# Patient Record
Sex: MALE | Race: WHITE | NOT HISPANIC OR LATINO | URBAN - METROPOLITAN AREA
[De-identification: names, ages, dates, MRNs, and addresses within clinical notes are randomized per-mention and may not be internally consistent; named-entity substitution may affect disease eponyms.]

---

## 2017-06-22 ENCOUNTER — EMERGENCY (EMERGENCY)
Facility: HOSPITAL | Age: 37
LOS: 1 days | Discharge: HOME | End: 2017-06-22
Attending: EMERGENCY MEDICINE | Admitting: EMERGENCY MEDICINE
Payer: OTHER MISCELLANEOUS

## 2017-06-22 VITALS
DIASTOLIC BLOOD PRESSURE: 85 MMHG | OXYGEN SATURATION: 98 % | HEART RATE: 77 BPM | TEMPERATURE: 98 F | SYSTOLIC BLOOD PRESSURE: 132 MMHG | RESPIRATION RATE: 15 BRPM

## 2017-06-22 PROCEDURE — 99282 EMERGENCY DEPT VISIT SF MDM: CPT

## 2017-06-22 RX ORDER — TETANUS TOXOID, REDUCED DIPHTHERIA TOXOID AND ACELLULAR PERTUSSIS VACCINE, ADSORBED 5; 2.5; 8; 8; 2.5 [IU]/.5ML; [IU]/.5ML; UG/.5ML; UG/.5ML; UG/.5ML
0.5 SUSPENSION INTRAMUSCULAR ONCE
Qty: 0 | Refills: 0 | Status: COMPLETED | OUTPATIENT
Start: 2017-06-22 | End: 2017-06-22

## 2017-06-22 RX ADMIN — TETANUS TOXOID, REDUCED DIPHTHERIA TOXOID AND ACELLULAR PERTUSSIS VACCINE, ADSORBED 0.5 MILLILITER(S): 5; 2.5; 8; 8; 2.5 SUSPENSION INTRAMUSCULAR at 12:06

## 2017-06-22 NOTE — ED PROVIDER NOTE - OBJECTIVE STATEMENT
37yo right-handed male no pmh p/w laceration to right hand while working on a light fixture today, cut right palm on metal, no broken fragments. Pt denies other trauma, fevers, vomiting, diarrhea, paresthesias, weakness. Unknown last tetanus vaccination.

## 2017-06-22 NOTE — ED PROVIDER NOTE - MEDICAL DECISION MAKING DETAILS
36M RHD p/w right palm laceration proximal to 1st digit, neurovascularly intact, full ROM of thumb. Superficial laceration, will irrigate, place steri strips, Tdap. Will prescribe augmentin as he works with his hands frequently.

## 2017-06-22 NOTE — ED PROVIDER NOTE - ATTENDING CONTRIBUTION TO CARE
Nico BYRNE- 37 Y/O M p/w rt hand cut to palmar aspect just below the thumb while trying to remove a bulb fixture at job and the metal cut his hand, no break of bulb ot glass, mild bleeding, self resolved. Last tetanus unknown    pt is alert, well appearing male, s1s2 normal reg, b/l clear breathe sounds, abd soft, nt, nd, rt hand linear lacerations superficial 2 cm in size, parallel to thumb mcp crease, skin warm, dry, good turgor    plan to heal by secondary intention, given superficial and along crease lines, covered with augmentin given nature of job of the patient, advised to keep it dry and clean

## 2017-12-20 ENCOUNTER — OUTPATIENT (OUTPATIENT)
Dept: OUTPATIENT SERVICES | Facility: HOSPITAL | Age: 37
LOS: 1 days | End: 2017-12-20

## 2017-12-20 VITALS
HEIGHT: 64 IN | WEIGHT: 149.91 LBS | SYSTOLIC BLOOD PRESSURE: 118 MMHG | RESPIRATION RATE: 16 BRPM | HEART RATE: 76 BPM | DIASTOLIC BLOOD PRESSURE: 86 MMHG | TEMPERATURE: 98 F

## 2017-12-20 DIAGNOSIS — D17.1 BENIGN LIPOMATOUS NEOPLASM OF SKIN AND SUBCUTANEOUS TISSUE OF TRUNK: ICD-10-CM

## 2017-12-20 LAB
HCT VFR BLD CALC: 45.1 % — SIGNIFICANT CHANGE UP (ref 39–50)
HGB BLD-MCNC: 15.7 G/DL — SIGNIFICANT CHANGE UP (ref 13–17)
MCHC RBC-ENTMCNC: 30.3 PG — SIGNIFICANT CHANGE UP (ref 27–34)
MCHC RBC-ENTMCNC: 34.8 % — SIGNIFICANT CHANGE UP (ref 32–36)
MCV RBC AUTO: 86.9 FL — SIGNIFICANT CHANGE UP (ref 80–100)
NRBC # FLD: 0 — SIGNIFICANT CHANGE UP
PLATELET # BLD AUTO: 247 K/UL — SIGNIFICANT CHANGE UP (ref 150–400)
PMV BLD: 10.1 FL — SIGNIFICANT CHANGE UP (ref 7–13)
RBC # BLD: 5.19 M/UL — SIGNIFICANT CHANGE UP (ref 4.2–5.8)
RBC # FLD: 12 % — SIGNIFICANT CHANGE UP (ref 10.3–14.5)
WBC # BLD: 8.83 K/UL — SIGNIFICANT CHANGE UP (ref 3.8–10.5)
WBC # FLD AUTO: 8.83 K/UL — SIGNIFICANT CHANGE UP (ref 3.8–10.5)

## 2017-12-20 NOTE — H&P PST ADULT - ACTIVITY
eliptical 5 miles a week, "I have a gym in my house, I do 3-5 times a week, I walk my dog a mile everyday"

## 2017-12-20 NOTE — H&P PST ADULT - FAMILY HISTORY
Grandparent  Still living? No  Family history of heart attack, Age at diagnosis: Age Unknown     Grandparent  Still living? No  Family history of heart attack, Age at diagnosis: Age Unknown

## 2017-12-20 NOTE — H&P PST ADULT - NEUROLOGICAL SYMPTOMS
"I get cold sometimes in my feet and my hands, my Medical Doctor did a test where I put my hands and feet on a metal plate"

## 2017-12-20 NOTE — H&P PST ADULT - NSANTHOSAYNRD_GEN_A_CORE
No. MELO screening performed.  STOP BANG Legend: 0-2 = LOW Risk; 3-4 = INTERMEDIATE Risk; 5-8 = HIGH Risk

## 2018-01-03 ENCOUNTER — TRANSCRIPTION ENCOUNTER (OUTPATIENT)
Age: 38
End: 2018-01-03

## 2018-01-04 ENCOUNTER — RESULT REVIEW (OUTPATIENT)
Age: 38
End: 2018-01-04

## 2018-01-04 ENCOUNTER — OUTPATIENT (OUTPATIENT)
Dept: OUTPATIENT SERVICES | Facility: HOSPITAL | Age: 38
LOS: 1 days | Discharge: HOME | End: 2018-01-04
Payer: COMMERCIAL

## 2018-01-04 VITALS
OXYGEN SATURATION: 100 % | DIASTOLIC BLOOD PRESSURE: 66 MMHG | TEMPERATURE: 97 F | SYSTOLIC BLOOD PRESSURE: 115 MMHG | RESPIRATION RATE: 16 BRPM | HEART RATE: 80 BPM

## 2018-01-04 VITALS
DIASTOLIC BLOOD PRESSURE: 85 MMHG | RESPIRATION RATE: 18 BRPM | HEIGHT: 64 IN | HEART RATE: 62 BPM | SYSTOLIC BLOOD PRESSURE: 115 MMHG | WEIGHT: 149.91 LBS | TEMPERATURE: 98 F | OXYGEN SATURATION: 98 %

## 2018-01-04 DIAGNOSIS — D17.1 BENIGN LIPOMATOUS NEOPLASM OF SKIN AND SUBCUTANEOUS TISSUE OF TRUNK: ICD-10-CM

## 2018-01-04 PROCEDURE — 88304 TISSUE EXAM BY PATHOLOGIST: CPT | Mod: 26

## 2018-01-04 NOTE — ASU DISCHARGE PLAN (ADULT/PEDIATRIC). - NOTIFY
Bleeding that does not stop/Numbness, color, or temperature change to extremity/Pain not relieved by Medications/Unable to Urinate/Fever greater than 101

## 2018-01-04 NOTE — ASU DISCHARGE PLAN (ADULT/PEDIATRIC). - ASU FOLLOWUP
911 or go to the nearest Emergency Room [facility] ([facility]):

## 2018-01-04 NOTE — ASU DISCHARGE PLAN (ADULT/PEDIATRIC). - MEDICATION SUMMARY - MEDICATIONS TO TAKE
I will START or STAY ON the medications listed below when I get home from the hospital:    oxyCODONE-acetaminophen 5 mg-325 mg oral tablet  -- 1 tab(s) by mouth every 4 hours, As Needed -for severe pain MDD:6 tabs   -- Caution federal law prohibits the transfer of this drug to any person other  than the person for whom it was prescribed.  May cause drowsiness.  Alcohol may intensify this effect.  Use care when operating dangerous machinery.  This prescription cannot be refilled.  This product contains acetaminophen.  Do not use  with any other product containing acetaminophen to prevent possible liver damage.  Using more of this medication than prescribed may cause serious breathing problems.    -- Indication: For post op pain med

## 2018-01-04 NOTE — ASU DISCHARGE PLAN (ADULT/PEDIATRIC). - MEDICATION SUMMARY - MEDICATIONS TO STOP TAKING
I will STOP taking the medications listed below when I get home from the hospital:    amoxicillin-clavulanate 400 mg-57 mg/5 mL oral liquid  -- 10 milliliter(s) by mouth 2 times a day  -- Expires___________________  Finish all this medication unless otherwise directed by prescriber.  Refrigerate and shake well.  Expires_______________________  Take with food or milk.

## 2021-09-16 ENCOUNTER — FORM ENCOUNTER (OUTPATIENT)
Age: 41
End: 2021-09-16

## 2021-09-16 ENCOUNTER — APPOINTMENT (OUTPATIENT)
Dept: ORTHOPEDIC SURGERY | Facility: CLINIC | Age: 41
End: 2021-09-16
Payer: OTHER MISCELLANEOUS

## 2021-09-16 VITALS
HEART RATE: 81 BPM | DIASTOLIC BLOOD PRESSURE: 94 MMHG | SYSTOLIC BLOOD PRESSURE: 166 MMHG | WEIGHT: 180 LBS | HEIGHT: 67 IN | BODY MASS INDEX: 28.25 KG/M2

## 2021-09-16 DIAGNOSIS — M25.531 PAIN IN RIGHT WRIST: ICD-10-CM

## 2021-09-16 PROBLEM — Z00.00 ENCOUNTER FOR PREVENTIVE HEALTH EXAMINATION: Status: ACTIVE | Noted: 2021-09-16

## 2021-09-16 PROBLEM — F41.9 ANXIETY DISORDER, UNSPECIFIED: Chronic | Status: ACTIVE | Noted: 2017-12-20

## 2021-09-16 PROBLEM — K21.9 GASTRO-ESOPHAGEAL REFLUX DISEASE WITHOUT ESOPHAGITIS: Chronic | Status: ACTIVE | Noted: 2017-12-20

## 2021-09-16 PROBLEM — F32.9 MAJOR DEPRESSIVE DISORDER, SINGLE EPISODE, UNSPECIFIED: Chronic | Status: ACTIVE | Noted: 2017-12-20

## 2021-09-16 PROBLEM — K29.70 GASTRITIS, UNSPECIFIED, WITHOUT BLEEDING: Chronic | Status: ACTIVE | Noted: 2017-12-20

## 2021-09-16 PROCEDURE — 99203 OFFICE O/P NEW LOW 30 MIN: CPT

## 2021-09-16 PROCEDURE — 99072 ADDL SUPL MATRL&STAF TM PHE: CPT

## 2021-09-16 PROCEDURE — 73110 X-RAY EXAM OF WRIST: CPT | Mod: RT

## 2021-09-16 NOTE — DISCUSSION/SUMMARY
[de-identified] : 41-year-old male with right wrist pain\par \par Patient presents for evaluation of his right wrist.  Sustained an injury and has continued pain through the distal radius into the carpal bones.  No gross x-ray imaging findings consistent with displaced fracture, but given persistent pain and concern with return to work as an  my recommendation is for further imaging to rule out occult fracture versus ligamentous injury.  Patient voiced understanding and appreciation.\par \par Recommendation: Ice/rest/immobilization.  MRI imaging right wrist.\par \par Return to work guidance following MRI.\par \par

## 2021-09-16 NOTE — HISTORY OF PRESENT ILLNESS
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 He was standing on an 8 ft ladder at work and fell on 8/26/21 when the ladder fell out from under him. He states he fell approximately 6 feet onto his Right side. He was seen at [facility] ER and had x rays taken.  Initially he was told that he may have had a distal radius fracture, but ultimately was told that this was an over read.  He has persistent radial sided wrist pain particularly with rotation. He denies prior wrist pain or injury. He has not been back to work since the injury.   \par \par The patient's past medical history, past surgical history, medications and allergies were reviewed by me today with the patient and documented accordingly. In addition, the patient's family and social history, which were noncontributory to this visit, were reviewed also.

## 2021-09-18 ENCOUNTER — APPOINTMENT (OUTPATIENT)
Dept: MRI IMAGING | Facility: CLINIC | Age: 41
End: 2021-09-18

## 2021-09-30 ENCOUNTER — APPOINTMENT (OUTPATIENT)
Dept: ORTHOPEDIC SURGERY | Facility: CLINIC | Age: 41
End: 2021-09-30
Payer: OTHER MISCELLANEOUS

## 2021-09-30 DIAGNOSIS — S52.514D NONDISPLACED FRACTURE OF RIGHT RADIAL STYLOID PROCESS, SUBSEQUENT ENCOUNTER FOR CLOSED FRACTURE WITH ROUTINE HEALING: ICD-10-CM

## 2021-09-30 DIAGNOSIS — S69.81XA OTHER SPECIFIED INJURIES OF RIGHT WRIST, HAND AND FINGER(S), INITIAL ENCOUNTER: ICD-10-CM

## 2021-09-30 PROCEDURE — 99214 OFFICE O/P EST MOD 30 MIN: CPT

## 2021-09-30 PROCEDURE — 99072 ADDL SUPL MATRL&STAF TM PHE: CPT

## 2021-10-01 PROBLEM — S52.514D CLOSED NONDISPLACED FRACTURE OF STYLOID PROCESS OF RIGHT RADIUS WITH ROUTINE HEALING, SUBSEQUENT ENCOUNTER: Status: ACTIVE | Noted: 2021-10-01

## 2021-10-01 PROBLEM — S69.81XA: Status: ACTIVE | Noted: 2021-10-01

## 2021-10-01 NOTE — ADDENDUM
[FreeTextEntry1] : This note was written by Ida Gill on 09/30/2021 acting solely as a scribe for Dr. Mau Wagoner.\par \par All medical record entries made by the Scribe were at my, Dr. Mau Wagoner, direction and personally dictated by me on 09/30/2021. I have personally reviewed the chart and agree that the record accurately reflects my personal performance of the history, physical exam, assessment and plan.

## 2021-10-01 NOTE — DISCUSSION/SUMMARY
[de-identified] : 41-year-old male with right radial styloid fracture/TFCC tear\par \par Patient presents for follow-up of right wrist pain that confirms a nondisplaced radial styloid fracture.  In addition, there is a high-grade ulnar TFC tear. We discussed that the fracture will heal with conservative management, and his ulnar-sided symptoms may be due to either acute versus chronic injury.  We discussed continued routine follow-up for his wrist with frequent x-rays and clinical evaluations.  Was provided a volar resting brace today to wear until approximately 6 to 8 weeks following injury.  Patient may then return to a low impact routine/light duty at work.\par \par The patient is also concerned regarding his thoracic/lumbar spine injury at the same time as his wrist injury.  This is out of the scope of my clinical practice, and I have referred the patient to orthopedic spine for further evaluation.\par \par Recommendation: Volar brace given. Ice/rest/immobilization.  Light duty. \par \par Follow-up with hand ortho. Follow-up with spine ortho.  Follow-up as needed

## 2021-10-01 NOTE — HISTORY OF PRESENT ILLNESS
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 He has obtained MRI and here for review of results.  He was standing on an 8 ft ladder at work and fell on 8/26/21 when the ladder fell out from under him. He states he fell approximately 6 feet onto his right side. Initially he was told that he may have had a distal radius fracture, but ultimately was told that this was an over read.  He has persistent radial sided wrist pain particularly with rotation. He denies prior wrist pain or injury. He has not been back to work since the injury. Patient is also c/o of back pain after the injury from the thoracic and lumbar spine. He has been experiencing muscle spasms throughout the body.

## 2021-10-01 NOTE — PHYSICAL EXAM
[de-identified] : Oriented to time, place, person\par Mood: Normal\par Affect: Normal\par Appearance: Healthy, well appearing, no acute distress.\par Gait: Normal\par Assistive Devices: None\par \par Right wrist exam\par \par Skin: Clean, dry, intact. No ecchymosis. No swelling. No palpable joint effusion. No palpable deformity. No crepitus\par ROM: RIGHT Limited extension/flexion full supination/pronation.  \par Painful ROM: Painful wrist extension/flexion/supination\par Tenderness: +tenderness to palpation at the distal radius, distal ulna, the DRUJ.  Mild pain at the scapholunate interval.  Trace snuffbox pain.\par Strength: 5/5 wrist flexion, 5/5 wrist extension, 5/5 supination, 5/5 pronation\par Stability: Stable DRUJ \par Vasc: 2+ radial pulse, <2s cap refill\par Sensation: In tact to light touch throughout\par Neuro: Negative tinels over median nerve, AIN/PIN/Ulnar nerve in tact to motor/sensation. [de-identified] : 4 views of the right wrist were obtained 9.16.2021 that show no acute fracture or dislocation. There is no degenerative change seen. There is no gross malalignment. No significant other obvious osseous abnormality, otherwise unremarkable. \par \par MRI right wrist 9.23.2021 shows nondisplaced fx of the radial styloid process with intraarticular extension to the dorsal ulnar margin of the distal radius. High-grade ulnar TFC tear. Central and radial TFC tear. Contusion to the proximal lunate.

## 2021-10-04 ENCOUNTER — FORM ENCOUNTER (OUTPATIENT)
Age: 41
End: 2021-10-04

## 2021-10-08 ENCOUNTER — APPOINTMENT (OUTPATIENT)
Dept: ORTHOPEDIC SURGERY | Facility: CLINIC | Age: 41
End: 2021-10-08
Payer: OTHER MISCELLANEOUS

## 2021-10-08 VITALS
HEIGHT: 67 IN | WEIGHT: 180 LBS | SYSTOLIC BLOOD PRESSURE: 166 MMHG | BODY MASS INDEX: 28.25 KG/M2 | DIASTOLIC BLOOD PRESSURE: 94 MMHG | HEART RATE: 81 BPM

## 2021-10-08 DIAGNOSIS — M47.817 SPONDYLOSIS W/OUT MYELOPATHY OR RADICULOPATHY, LUMBOSACRAL REGION: ICD-10-CM

## 2021-10-08 DIAGNOSIS — M47.812 SPONDYLOSIS W/OUT MYELOPATHY OR RADICULOPATHY, CERVICAL REGION: ICD-10-CM

## 2021-10-08 PROCEDURE — 99203 OFFICE O/P NEW LOW 30 MIN: CPT

## 2021-10-08 PROCEDURE — 72082 X-RAY EXAM ENTIRE SPI 2/3 VW: CPT

## 2021-10-08 PROCEDURE — 99072 ADDL SUPL MATRL&STAF TM PHE: CPT

## 2021-10-08 NOTE — DISCUSSION/SUMMARY
[de-identified] : We discussed further treatment options.  We reviewed his imaging.  He wished to continue with nonsurgical treatment.  He will let me know of any changes or worsening of his symptoms.

## 2021-10-08 NOTE — PHYSICAL EXAM
[Antalgic] : not antalgic [Ataxic] : not ataxic [de-identified] : Examination of the cervical spine reveals no midline or paraspinal tenderness to palpation. No cervical lymphadenopathy. Decreased range of motion with respect to flexion, extension, rotation, and lateral bending. Negative Spurlings. Negative Lhermitte's. Full range of motion bilateral shoulders without evidence of impingement. No instability of bilateral upper extremities.  Cranial nerves II through XII grossly intact. Intact sensation bilateral upper extremities. 5/5 deltoids biceps triceps wrist extensors wrist flexors finger flexors and hand intrinsics.  Some limitations in strength and sensory examination of the right upper extremity due to his wrist orthosis.  1+ biceps triceps and brachioradialis reflexes. Negative Swartz's. 2+ radial pulse. Negative Tinel's over the cubital and carpal tunnel. No skin lesions on the right and left upper extremities.\par \par Examination of the thoracic and lumbar spine reveals no midline tenderness palpation, step-offs, or skin lesions. Decreased range of motion with respect to flexion, extension, lateral bending, and rotation. No tenderness to palpation of the sciatic notch. No tenderness palpation of the bilateral greater trochanters. No pain with passive internal/external rotation of the hips. No instability of bilateral lower extremities.  Negative SANTIAGO. Negative straight leg raise bilaterally. No bowstring. Negative femoral stretch. 5 out of 5 iliopsoas, hip abductors, hips adductors, quadriceps, hamstrings, gastrocsoleus, tibialis anterior, extensor hallucis longus, peroneals. Grossly intact sensation to light touch bilateral lower extremities. 1+ patellar and Achilles reflexes. Downgoing Babinski. No clonus. Intact proprioception. Palpable pulses. No skin lesion and no edema on the right and left lower extremities. [de-identified] : AP lateral scoliosis x-rays reveals preserved alignment.  No gross instability or aggressive lesions.  Mild spondylosis.

## 2021-10-08 NOTE — HISTORY OF PRESENT ILLNESS
[de-identified] : Mr. ANKIT VALDIVIA  is a 41 year old male who presents with diffuse spine spasms and low back pain since falling off an 8 foot ladder onto his right side at work on 8/26/21.  Most of his pain is in his low back and is worse when he stands and flexes slightly.  His spasms are intermittent and random.   Denies any LE radicular symptoms.  Normal bowel and bladder control.   Denies any recent fevers, chills, sweats, weight loss, or infection.\par \par The patients past medical history, past surgical history, medications, allergies, and social history were reviewed by me today with the patient and documented accordingly.  In addition, the patient's family history, which is noncontributory to their visit, was also reviewed.\par

## 2021-10-10 ENCOUNTER — FORM ENCOUNTER (OUTPATIENT)
Age: 41
End: 2021-10-10

## 2021-10-13 ENCOUNTER — APPOINTMENT (OUTPATIENT)
Dept: ORTHOPEDIC SURGERY | Facility: CLINIC | Age: 41
End: 2021-10-13

## 2021-10-14 ENCOUNTER — APPOINTMENT (OUTPATIENT)
Dept: ORTHOPEDIC SURGERY | Facility: CLINIC | Age: 41
End: 2021-10-14

## 2022-11-11 NOTE — H&P PST ADULT - TOBACCO USE
How Severe Is Your Skin Lesion?: mild Have Your Skin Lesions Been Treated?: not been treated Is This A New Presentation, Or A Follow-Up?: Skin Lesions Additional History: Spots on bilateral ears. And spot on right cheek. Former smoker

## 2023-12-26 ENCOUNTER — APPOINTMENT (OUTPATIENT)
Dept: ORTHOPEDIC SURGERY | Facility: CLINIC | Age: 43
End: 2023-12-26
Payer: OTHER MISCELLANEOUS

## 2023-12-26 VITALS — HEIGHT: 67 IN | BODY MASS INDEX: 25.9 KG/M2 | WEIGHT: 165 LBS

## 2023-12-26 DIAGNOSIS — Z87.19 PERSONAL HISTORY OF OTHER DISEASES OF THE DIGESTIVE SYSTEM: ICD-10-CM

## 2023-12-26 PROCEDURE — 73564 X-RAY EXAM KNEE 4 OR MORE: CPT | Mod: LT

## 2023-12-26 PROCEDURE — 99214 OFFICE O/P EST MOD 30 MIN: CPT

## 2023-12-26 RX ORDER — OMEPRAZOLE 10 MG/1
10 CAPSULE, DELAYED RELEASE ORAL
Refills: 0 | Status: ACTIVE | COMMUNITY

## 2023-12-26 NOTE — DISCUSSION/SUMMARY
[de-identified] : The patient sustained an injury to his left knee.  I believe he had a patella subluxation with injury to the femoral attachment of MPFL.  I have discussed the pathology, natural history and treatment options with him.  He is referred for physical therapy.  He will be reevaluated in 1 month.

## 2023-12-26 NOTE — HISTORY OF PRESENT ILLNESS
[Has the patient missed work because of the injury/illness?] : The patient has missed work because of the injury/illness. [No] : The patient is currently not working. [de-identified] : Mr. ANKIT VALDIVIA is a 43 year old gentleman presenting for initial evaluation of a left knee injury sustained on 12/5/23 after he twisted his knee while standing at work. He states the pain is achy in nature and localized to the medial aspect of the left knee. The pain is worse with walking and bending. He had an x-ray at an urgent care and an MRI at [facility] on 12/22/23 that was referred by Dr. Nirmal Jeong. He has been ambulating with a cane. He is unable to put his full weight on his left leg. He is not taking medication for the pain. He notes buckling of the left knee. He has not tried any PT since the injury.  [FreeTextEntry1] : Pain at the medial aspect of the left knee.  [FreeTextEntry2] :  [FreeTextEntry6] : 12/13/23

## 2023-12-26 NOTE — PHYSICAL EXAM
[Slightly Antalgic] : slightly antalgic [Cane] : ambulates with cane [LE] : Sensory: Intact in bilateral lower extremities [DP] : dorsalis pedis 2+ and symmetric bilaterally [PT] : posterior tibial 2+ and symmetric bilaterally [Normal] : Alert and in no acute distress [Poor Appearance] : well-appearing [Acute Distress] : not in acute distress [Obese] : not obese [de-identified] : The patient has no respiratory distress. Mood and affect are normal. The patient is alert and oriented to person, place and time. There is no pain with active or passive motion of the hips.  There is no tenderness of either hip.  Examination of the left knee demonstrates tenderness of the medial retinaculum at the medial femoral condyle.  There is no instability of collateral or cruciate ligaments.  There is no joint line tenderness.  Maranda test is negative.  Range of motion 0 to 110 degrees left, 0 to 120 degrees right.  The calves are soft and nontender.  The skin is intact.  There is no lymphedema. [de-identified] : Exam requested by: SHAYY PEDRAZA MD [id number] [id number] SITE PERFORMED: [facility] Patient: ANKIT CARMICHAEL YOB: 1980 Phone: [phone] MRN: [id number] Acc: [id number] Date of Exam: 12-   EXAM:  MRI LEFT KNEE WITHOUT CONTRAST  HISTORY:  Knee pain.  TECHNIQUE: Multiplanar, multi-sequential MRI of the left knee was obtained on a 1.5T scanner according to standard protocol.   COMPARISON:  None available.  FINDINGS:    Bone: Small contusion in the anteromedial aspect of the medial femoral condyle. No additional contusion or fracture.  Ligaments and tendons: The anterior cruciate, posterior cruciate, medial collateral, and fibular collateral ligaments are intact. The posteromedial and posterolateral corner structures are unremarkable.  Extensor mechanism: There is a moderate to high-grade partial tear of the medial patellofemoral ligament at its femoral origin, with up to 7 mm retraction of torn fibers. Low-grade midsubstance sprain of the medial retinaculum. Lateral retinaculum, quadriceps tendon, patellar tendon are unremarkable.  Medial compartment:  The medial meniscus is intact.   The femorotibial cartilage surfaces are preserved.  Lateral compartment:   The lateral meniscus is intact.   The femorotibial cartilage surfaces are preserved.  Patellofemoral compartment: Patellar and trochlear cartilage surfaces are preserved.  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 The tibial tubercle-trochlear groove distance measures approximately 12 mm.  Joint space: Small effusion.  No popliteal cyst.  Proximal tibiofibular joint: There is a septated ganglion cyst tracking from the posteromedial aspect of the joint, measuring approximately 1.5 x 1.2 x 0.9 cm.  Neurovascular structures:  Unremarkable.  Musculature: Unremarkable.  Subcutaneous tissues:  Unremarkable.  IMPRESSION: MRI of the left knee demonstrates:  1.  Contusion in the anteromedial aspect of the medial femoral condyle. No additional contusion or fracture. 2.  Moderate to high-grade partial tear of the medial patellofemoral ligament at its femoral origin. Low-grade midsubstance sprain of the medial retinaculum. 3.  No meniscal tear. No cartilage defects. 4.  Small knee joint effusion. 5.  Septated ganglion cyst along the posteromedial aspect of the proximal tibiofibular joint.  Thank you for the opportunity to participate in the care of this patient.     Ana Paula Jansen MD  - Electronically Signed: 12- 11:32 PM  Physician to Physician Direct Line is: [phone]   AP, lateral, tunnel and sunrise x-rays of the left knee taken today demonstrate no fracture, no dislocation and no bony abnormality.

## 2024-01-05 ENCOUNTER — NON-APPOINTMENT (OUTPATIENT)
Age: 44
End: 2024-01-05

## 2024-01-26 ENCOUNTER — APPOINTMENT (OUTPATIENT)
Dept: ORTHOPEDIC SURGERY | Facility: CLINIC | Age: 44
End: 2024-01-26
Payer: OTHER MISCELLANEOUS

## 2024-01-26 VITALS — SYSTOLIC BLOOD PRESSURE: 120 MMHG | HEART RATE: 88 BPM | DIASTOLIC BLOOD PRESSURE: 79 MMHG

## 2024-01-26 PROCEDURE — 99213 OFFICE O/P EST LOW 20 MIN: CPT

## 2024-01-26 NOTE — PHYSICAL EXAM
[Slightly Antalgic] : slightly antalgic [Cane] : ambulates with cane [LE] : Sensory: Intact in bilateral lower extremities [DP] : dorsalis pedis 2+ and symmetric bilaterally [PT] : posterior tibial 2+ and symmetric bilaterally [Normal] : Alert and in no acute distress [Poor Appearance] : well-appearing [Acute Distress] : not in acute distress [Obese] : not obese [de-identified] : The patient has no respiratory distress. Mood and affect are normal. The patient is alert and oriented to person, place and time. There is no pain with active or passive motion of the hips.  There is no tenderness of either hip.  Examination of the left knee demonstrates tenderness of the medial retinaculum at the medial femoral condyle.  There is no instability of collateral or cruciate ligaments.  There is no joint line tenderness.  Maranda test is negative.  Range of motion 0 to 110 degrees left, 0 to 120 degrees right.  The calves are soft and nontender.  The skin is intact.  There is no lymphedema.

## 2024-01-26 NOTE — HISTORY OF PRESENT ILLNESS
[de-identified] : The patient presents for reevaluation of left knee injury. He had gone to PT for 3 sessions but was pending authorization until yesterday. He has been working on a HEP daily with some improvement. He complains of intermittent aching pain in the knee especially while getting in and out of the car. He has been ambulating with the assistance of a cane. He has not any recurrent episodes of subluxation. He is not currently working at this time.

## 2024-01-26 NOTE — DISCUSSION/SUMMARY
[de-identified] : The patient has had some improvement in the condition of his left knee but has not yet had adequate physical therapy.  He has weakness and pain at the present time.  He needs to continue his physical therapy program.  He is unable to work at this time.  He will be reevaluated in 1 month.

## 2024-02-22 NOTE — ASU DISCHARGE PLAN (ADULT/PEDIATRIC). - ACTIVITY LEVEL
Mid chest- 2.5 cm inflamed epidermal inclusion cyst     PROCEDURE: SKIN LESION EXCISION    PLAN:  After informed consent was obtained, using Betadine for cleansing and 1% Lidocaine with epinephrine for anesthetic, with sterile technique, elliptical excision in total was performed. Antibiotic dressing is applied, and wound care instructions provided.  Be alert for any signs of cutaneous infection. The procedure was well tolerated without complications. Follow up: the specimen is labeled and sent to pathology for evaluation, return for suture removal in 14 days.      left upper back- 1.5 cm inflamed epidermal inclusion cyst     PROCEDURE: SKIN LESION EXCISION    PLAN:  After informed consent was obtained, using Betadine for cleansing and 1% Lidocaine with epinephrine for anesthetic, with sterile technique, elliptical excision in total was performed. Antibiotic dressing is applied, and wound care instructions provided.  Be alert for any signs of cutaneous infection. The procedure was well tolerated without complications. Follow up: the specimen is labeled and sent to pathology for evaluation, return for suture removal in 14 days.      I have personally reviewed and analyzed the patient's medical record in detail.     Cam Okeefe MD  Follow up per biopsy results   
Suture wound care provided to patient. All questions were answered. Patient verbalized understanding of information given. Name and phone number provided if needed. Time out involving the provider, staff and patient in exam room performed prior to procedure.  The patient is alerted to watch for any signs of infection (redness, pus, pain, increased swelling or fever) and call if such occurs.    Adriana Stewart, [facility]    
no exercise/no heavy lifting/no sports/gym

## 2024-02-23 ENCOUNTER — APPOINTMENT (OUTPATIENT)
Dept: ORTHOPEDIC SURGERY | Facility: CLINIC | Age: 44
End: 2024-02-23
Payer: OTHER MISCELLANEOUS

## 2024-02-23 VITALS
WEIGHT: 165 LBS | SYSTOLIC BLOOD PRESSURE: 131 MMHG | DIASTOLIC BLOOD PRESSURE: 69 MMHG | HEART RATE: 71 BPM | BODY MASS INDEX: 25.9 KG/M2 | HEIGHT: 67 IN

## 2024-02-23 PROCEDURE — 99213 OFFICE O/P EST LOW 20 MIN: CPT

## 2024-02-23 NOTE — HISTORY OF PRESENT ILLNESS
[de-identified] : The patient presents for reevaluation of left knee injury. He has changed PT facilities and attends twice a week with great improvement. He complains of intermittent aching pain in the knee especially with full extension of the knee. He has not any recurrent episodes of subluxation. He is not currently working at this time.

## 2024-02-23 NOTE — HISTORY OF PRESENT ILLNESS
[de-identified] : The patient presents for reevaluation of left knee injury. He has changed PT facilities and attends twice a week with great improvement. He complains of intermittent aching pain in the knee especially with full extension of the knee. He has not any recurrent episodes of subluxation. He is not currently working at this time.

## 2024-02-23 NOTE — PHYSICAL EXAM
[Slightly Antalgic] : slightly antalgic [Cane] : ambulates with cane [LE] : Sensory: Intact in bilateral lower extremities [DP] : dorsalis pedis 2+ and symmetric bilaterally [PT] : posterior tibial 2+ and symmetric bilaterally [Normal] : Alert and in no acute distress [Poor Appearance] : well-appearing [Acute Distress] : not in acute distress [Obese] : not obese [de-identified] : The patient has no respiratory distress. Mood and affect are normal. The patient is alert and oriented to person, place and time. There is no pain with active or passive motion of the hips.  There is no tenderness of either hip.  Examination of the left knee demonstrates tenderness of the medial retinaculum at the medial femoral condyle.  There is no instability of collateral or cruciate ligaments.  There is no joint line tenderness.  Maranda test is negative.  Range of motion 0 to 110 degrees left, 0 to 120 degrees right.  The calves are soft and nontender.  The skin is intact.  There is no lymphedema.

## 2024-02-23 NOTE — DISCUSSION/SUMMARY
[de-identified] : The patient has had some improvement with physical therapy but still has stiffness and weakness in his left knee.  He is unable to return to his normal work duties.  He will continue physical therapy and be reevaluated in 1 month.

## 2024-02-23 NOTE — HISTORY OF PRESENT ILLNESS
[de-identified] : The patient presents for reevaluation of left knee injury. He has changed PT facilities and attends twice a week with great improvement. He complains of intermittent aching pain in the knee especially with full extension of the knee. He has not any recurrent episodes of subluxation. He is not currently working at this time.

## 2024-02-23 NOTE — PHYSICAL EXAM
[Slightly Antalgic] : slightly antalgic [Cane] : ambulates with cane [LE] : Sensory: Intact in bilateral lower extremities [DP] : dorsalis pedis 2+ and symmetric bilaterally [PT] : posterior tibial 2+ and symmetric bilaterally [Normal] : Alert and in no acute distress [Poor Appearance] : well-appearing [Acute Distress] : not in acute distress [Obese] : not obese [de-identified] : The patient has no respiratory distress. Mood and affect are normal. The patient is alert and oriented to person, place and time. There is no pain with active or passive motion of the hips.  There is no tenderness of either hip.  Examination of the left knee demonstrates tenderness of the medial retinaculum at the medial femoral condyle.  There is no instability of collateral or cruciate ligaments.  There is no joint line tenderness.  Maranda test is negative.  Range of motion 0 to 110 degrees left, 0 to 120 degrees right.  The calves are soft and nontender.  The skin is intact.  There is no lymphedema.

## 2024-02-23 NOTE — PHYSICAL EXAM
[Slightly Antalgic] : slightly antalgic [Cane] : ambulates with cane [LE] : Sensory: Intact in bilateral lower extremities [DP] : dorsalis pedis 2+ and symmetric bilaterally [PT] : posterior tibial 2+ and symmetric bilaterally [Normal] : Alert and in no acute distress [Poor Appearance] : well-appearing [Acute Distress] : not in acute distress [Obese] : not obese [de-identified] : The patient has no respiratory distress. Mood and affect are normal. The patient is alert and oriented to person, place and time. There is no pain with active or passive motion of the hips.  There is no tenderness of either hip.  Examination of the left knee demonstrates tenderness of the medial retinaculum at the medial femoral condyle.  There is no instability of collateral or cruciate ligaments.  There is no joint line tenderness.  Maranda test is negative.  Range of motion 0 to 110 degrees left, 0 to 120 degrees right.  The calves are soft and nontender.  The skin is intact.  There is no lymphedema.

## 2024-02-23 NOTE — DISCUSSION/SUMMARY
[de-identified] : The patient has had some improvement with physical therapy but still has stiffness and weakness in his left knee.  He is unable to return to his normal work duties.  He will continue physical therapy and be reevaluated in 1 month.

## 2024-02-23 NOTE — DISCUSSION/SUMMARY
[de-identified] : The patient has had some improvement with physical therapy but still has stiffness and weakness in his left knee.  He is unable to return to his normal work duties.  He will continue physical therapy and be reevaluated in 1 month.

## 2024-03-05 ENCOUNTER — NON-APPOINTMENT (OUTPATIENT)
Age: 44
End: 2024-03-05

## 2024-03-14 ENCOUNTER — NON-APPOINTMENT (OUTPATIENT)
Age: 44
End: 2024-03-14

## 2024-03-22 ENCOUNTER — APPOINTMENT (OUTPATIENT)
Dept: ORTHOPEDIC SURGERY | Facility: CLINIC | Age: 44
End: 2024-03-22
Payer: OTHER MISCELLANEOUS

## 2024-03-22 VITALS
TEMPERATURE: 97 F | WEIGHT: 165 LBS | SYSTOLIC BLOOD PRESSURE: 132 MMHG | BODY MASS INDEX: 25.9 KG/M2 | OXYGEN SATURATION: 98 % | HEIGHT: 67 IN | HEART RATE: 86 BPM | DIASTOLIC BLOOD PRESSURE: 88 MMHG

## 2024-03-22 DIAGNOSIS — S89.92XA UNSPECIFIED INJURY OF LEFT LOWER LEG, INITIAL ENCOUNTER: ICD-10-CM

## 2024-03-22 PROCEDURE — 99213 OFFICE O/P EST LOW 20 MIN: CPT

## 2024-03-22 NOTE — DISCUSSION/SUMMARY
[de-identified] : The patient continues to be symptomatic of his left knee injury.  He should continue physical therapy to work on range of motion and strength.  He will return to work light duty April 1, 2024.  At this point he has a temporary partial mild to moderate disability.  He will be reevaluated in 6 weeks.

## 2024-03-22 NOTE — PHYSICAL EXAM
[Slightly Antalgic] : slightly antalgic [Cane] : ambulates with cane [LE] : Sensory: Intact in bilateral lower extremities [DP] : dorsalis pedis 2+ and symmetric bilaterally [PT] : posterior tibial 2+ and symmetric bilaterally [Normal] : Alert and in no acute distress [Poor Appearance] : well-appearing [Acute Distress] : not in acute distress [Obese] : not obese [de-identified] : The patient has no respiratory distress. Mood and affect are normal. The patient is alert and oriented to person, place and time. There is no pain with active or passive motion of the hips.  There is no tenderness of either hip.  Examination of the left knee demonstrates no swelling or deformity.  Quadriceps and hamstring function are intact although he has weakness.  There is no instability of collateral or cruciate ligaments.  Right knee range of motion 0 to 120 degrees.  Left knee range of motion 10 to 100 degrees.  Calves are soft and nontender.  The skin is intact.  There is no lymphedema.

## 2024-03-22 NOTE — HISTORY OF PRESENT ILLNESS
[de-identified] : The patient presents for reevaluation of left knee injury. He has continued PT with great improvement. He has minimal complaints at this time. He is interested in discussing return to work.

## 2024-05-09 ENCOUNTER — APPOINTMENT (OUTPATIENT)
Dept: ORTHOPEDIC SURGERY | Facility: CLINIC | Age: 44
End: 2024-05-09

## 2025-01-27 NOTE — PHYSICAL EXAM
[de-identified] : Oriented to time, place, person\par Mood: Normal\par Affect: Normal\par Appearance: Healthy, well appearing, no acute distress.\par Gait: Normal\par Assistive Devices: None\par \par Right wrist exam\par \par Skin: Clean, dry, intact. No ecchymosis. No swelling. No palpable joint effusion. No palpable deformity. No crepitus\par ROM: RIGHT Limited extension/flexion full supination/pronation.  \par Painful ROM: Painful wrist extension/flexion/supination\par Tenderness: +tenderness to palpation at the distal radius, distal ulna, the DRUJ.  Mild pain at the scapholunate interval.  Trace snuffbox pain.\par Strength: 5/5 wrist flexion, 5/5 wrist extension, 5/5 supination, 5/5 pronation\par Stability: Stable DRUJ \par Vasc: 2+ radial pulse, <2s cap refill\par Sensation: In tact to light touch throughout\par Neuro: Negative tinels over median nerve, AIN/PIN/Ulnar nerve in tact to motor/sensation. [de-identified] : \par The following radiographs were ordered and read by me during this patients visit. I reviewed each radiograph in detail with the patient and discussed the findings as highlighted below. \par \par 4 views of the right wrist were obtained today that show no acute fracture or dislocation. There is no degenerative change seen. There is no gross malalignment. No significant other obvious osseous abnormality, otherwise unremarkable.  No